# Patient Record
Sex: FEMALE | URBAN - METROPOLITAN AREA
[De-identification: names, ages, dates, MRNs, and addresses within clinical notes are randomized per-mention and may not be internally consistent; named-entity substitution may affect disease eponyms.]

---

## 2021-01-26 ENCOUNTER — APPOINTMENT (RX ONLY)
Dept: URBAN - METROPOLITAN AREA CLINIC 170 | Facility: CLINIC | Age: 36
Setting detail: DERMATOLOGY
End: 2021-01-26

## 2021-01-26 DIAGNOSIS — Z02.9 ENCOUNTER FOR ADMINISTRATIVE EXAMINATIONS, UNSPECIFIED: ICD-10-CM

## 2021-01-26 DIAGNOSIS — D18.0 HEMANGIOMA: ICD-10-CM | Status: STABLE

## 2021-01-26 DIAGNOSIS — L90.8 OTHER ATROPHIC DISORDERS OF SKIN: ICD-10-CM | Status: STABLE

## 2021-01-26 PROBLEM — D18.01 HEMANGIOMA OF SKIN AND SUBCUTANEOUS TISSUE: Status: ACTIVE | Noted: 2021-01-26

## 2021-01-26 PROCEDURE — ? ADDITIONAL NOTES

## 2021-01-26 PROCEDURE — ? IN-HOUSE DISPENSING PHARMACY

## 2021-01-26 PROCEDURE — ? PRESCRIPTION MEDICATION MANAGEMENT

## 2021-01-26 PROCEDURE — ? COUNSELING

## 2021-01-26 PROCEDURE — ? COSMETIC CONSULTATION: BOTOX

## 2021-01-26 PROCEDURE — 99202 OFFICE O/P NEW SF 15 MIN: CPT

## 2021-01-26 PROCEDURE — ? EDUCATIONAL RESOURCES PROVIDED

## 2021-01-26 ASSESSMENT — LOCATION DETAILED DESCRIPTION DERM
LOCATION DETAILED: RIGHT INFERIOR LATERAL FOREHEAD
LOCATION DETAILED: RIGHT INFERIOR TEMPLE
LOCATION DETAILED: LEFT SUPERIOR CENTRAL MALAR CHEEK

## 2021-01-26 ASSESSMENT — LOCATION ZONE DERM: LOCATION ZONE: FACE

## 2021-01-26 ASSESSMENT — LOCATION SIMPLE DESCRIPTION DERM
LOCATION SIMPLE: RIGHT TEMPLE
LOCATION SIMPLE: RIGHT FOREHEAD
LOCATION SIMPLE: LEFT CHEEK

## 2021-01-26 NOTE — PROCEDURE: PRESCRIPTION MEDICATION MANAGEMENT
Detail Level: Detailed
Render In Strict Bullet Format?: No
Initiate Treatment: R/essentials Advanced eye repair bid, Skinceuticals Phloretin QAM, Obagi Tret 0.025% cream 1-2x weekly at night after Tarte moisturizer.

## 2021-01-26 NOTE — PROCEDURE: IN-HOUSE DISPENSING PHARMACY
Product 36 Unit Type: mg
Product 12 Units Dispensed: 0
Name Of Product 5: R/Essentials Brightening Pads + 6% Hydroquinone
Name Of Product 3: Latisse 0.03%
Product 3 Application Directions: Apply to upper lash line Qpm
Product 3 Unit Type: ml
Product 1 Amount/Unit (Numbers Only): 20
Name Of Product 4: Obagi Tretinoin 0.05% cream
Name Of Product 2: Obagi C-Clarifying serum N-D
Render Product Pricing In Note: Yes
Product 2 Application Directions: Apply qam
Name Of Product 1: Obagi Tretinoin 0.025%
Product 3 Price/Unit (In Dollars): 01
Product 5 Application Directions: use to aa BID
Product 1 Units Dispensed: 1
Product 3 Refills: 2
Product 3 Amount/Unit (Numbers Only): 3
Product 1 Application Directions: Apply 1-2x weekly at night before increasing to QPM as tolerated.
Detail Level: Zone
Product 1 Unit Type: grams

## 2021-01-26 NOTE — HPI: FACE (AGING FACE)
How Severe Is It?: mild
Is This A New Presentation, Or A Follow-Up?: Aging Face
Additional History: Patient uses several otc facial products including advanced dermatologist retinol, Staci eye cream, vitamin c serums.